# Patient Record
Sex: MALE | Race: WHITE | NOT HISPANIC OR LATINO | Employment: UNEMPLOYED | URBAN - METROPOLITAN AREA
[De-identification: names, ages, dates, MRNs, and addresses within clinical notes are randomized per-mention and may not be internally consistent; named-entity substitution may affect disease eponyms.]

---

## 2017-03-05 ENCOUNTER — HOSPITAL ENCOUNTER (OUTPATIENT)
Dept: GENERAL RADIOLOGY | Facility: HOSPITAL | Age: 22
Discharge: HOME | End: 2017-03-05
Admitting: INTERNAL MEDICINE

## 2017-03-05 DIAGNOSIS — T14.8XXA FRACTURE: ICD-10-CM

## 2017-03-05 PROCEDURE — 73140 X-RAY EXAM OF FINGER(S): CPT

## 2017-03-05 NOTE — NURSING NOTE
Dr Gonzalez read results of finger x-ray right 4th finger:  Nondisplaced terminal tuft fracture with overlying tissue swelling.  Results called to Dr Mo Choi who then spoke directly to Pts mother and pt.  May go home.  No IV site.

## 2017-08-04 ENCOUNTER — LAB (OUTPATIENT)
Dept: LAB | Facility: HOSPITAL | Age: 22
End: 2017-08-04

## 2017-08-04 ENCOUNTER — TRANSCRIBE ORDERS (OUTPATIENT)
Dept: ADMINISTRATIVE | Facility: HOSPITAL | Age: 22
End: 2017-08-04

## 2017-08-04 DIAGNOSIS — J02.9 ACUTE PHARYNGITIS, UNSPECIFIED ETIOLOGY: ICD-10-CM

## 2017-08-04 DIAGNOSIS — J02.9 ACUTE PHARYNGITIS, UNSPECIFIED ETIOLOGY: Primary | ICD-10-CM

## 2017-08-04 PROCEDURE — 87070 CULTURE OTHR SPECIMN AEROBIC: CPT | Performed by: INTERNAL MEDICINE

## 2017-08-06 LAB — BACTERIA SPEC AEROBE CULT: NORMAL
